# Patient Record
Sex: MALE | URBAN - METROPOLITAN AREA
[De-identification: names, ages, dates, MRNs, and addresses within clinical notes are randomized per-mention and may not be internally consistent; named-entity substitution may affect disease eponyms.]

---

## 2023-09-26 ENCOUNTER — HOSPITAL ENCOUNTER (OUTPATIENT)
Dept: GENERAL RADIOLOGY | Age: 53
Discharge: HOME | End: 2023-09-29

## 2023-09-26 DIAGNOSIS — T14.90XA INJURY: ICD-10-CM

## 2023-09-26 PROCEDURE — 73140 X-RAY EXAM OF FINGER(S): CPT

## 2023-10-11 ENCOUNTER — OFFICE VISIT (OUTPATIENT)
Dept: ORTHOPEDIC SURGERY | Age: 53
End: 2023-10-11
Payer: COMMERCIAL

## 2023-10-11 ENCOUNTER — EVALUATION (OUTPATIENT)
Age: 53
End: 2023-10-11

## 2023-10-11 DIAGNOSIS — M20.012 MALLET DEFORMITY OF LEFT RING FINGER: ICD-10-CM

## 2023-10-11 DIAGNOSIS — M20.012 MALLET DEFORMITY OF LEFT RING FINGER: Primary | ICD-10-CM

## 2023-10-11 DIAGNOSIS — M79.645 FINGER PAIN, LEFT: ICD-10-CM

## 2023-10-11 DIAGNOSIS — M25.642 STIFFNESS OF FINGER JOINT OF LEFT HAND: ICD-10-CM

## 2023-10-11 DIAGNOSIS — M79.645 FINGER PAIN, LEFT: Primary | ICD-10-CM

## 2023-10-11 PROCEDURE — 99204 OFFICE O/P NEW MOD 45 MIN: CPT | Performed by: ORTHOPAEDIC SURGERY

## 2023-10-11 PROCEDURE — L3933 FO W/O JOINTS CF: HCPCS | Performed by: OCCUPATIONAL THERAPIST

## 2023-10-11 NOTE — PROGRESS NOTES
hyperextend the DIP joint, hopefully the custom made splint will help hyperextend the DIP joint more and correct the deformity, I will reassess in 4 weeks. Patient voiced accordance and understanding of the information provided and the formulated plan. All questions were answered to the patient's satisfaction during the encounter.     Navya Maradiaga MD  Orthopaedic Surgery  10/11/23  12:24 PM

## 2023-11-01 ENCOUNTER — TELEPHONE (OUTPATIENT)
Age: 53
End: 2023-11-01

## 2023-11-01 NOTE — TELEPHONE ENCOUNTER
Finally rec'ed approval for 8 OT visits from W/C called pt to get scheduled with [facility]. Lvm to call therapy dept back

## 2023-11-03 NOTE — TELEPHONE ENCOUNTER
Pt called and lvm at 4:30pm 11/2/23---I attempted to call back this morning---Lvm to call therapy dept back

## 2023-11-15 ENCOUNTER — OFFICE VISIT (OUTPATIENT)
Dept: ORTHOPEDIC SURGERY | Age: 53
End: 2023-11-15

## 2023-11-15 ENCOUNTER — TREATMENT (OUTPATIENT)
Age: 53
End: 2023-11-15

## 2023-11-15 DIAGNOSIS — M25.642 STIFFNESS OF FINGER JOINT OF LEFT HAND: Primary | ICD-10-CM

## 2023-11-15 DIAGNOSIS — M79.645 FINGER PAIN, LEFT: ICD-10-CM

## 2023-11-15 DIAGNOSIS — M20.012 MALLET DEFORMITY OF LEFT RING FINGER: Primary | ICD-10-CM

## 2023-11-15 PROCEDURE — 97760 ORTHOTIC MGMT&TRAING 1ST ENC: CPT | Performed by: OCCUPATIONAL THERAPIST

## 2023-11-15 NOTE — PROGRESS NOTES
[facility] Fin[facility]  [address] [place] [id number]  Dept: [phone]   Occupational Therapy Orthotic Recheck/ Orthotic Initial Management Note    Referring MD:   Dr Carisa Mack  Date: 11/15/2023  Diagnosis:    Diagnosis Orders   1. Stiffness of finger joint of left hand        2.  Finger pain, left [M79.645]             Therapy Precautions: Tendon precautions    Total Timed Codes:   15 min, Total Treatment Time: 15 min  Modifier needed: No  Episode visit count:  2     PMH:   Affected Extremity: left    Date of Injury:   9/26/23    Date of Surgery: NA    Mechanism of Injury: football    Wound/Pin/Incision: NA    ORTHOSIS Fabricated:     Fabricated new mallet splint due to decreasing edema to ensure proper fit and to increase DIP within orthosis per Dr Alison Bowman , (charging Orthotic Initial Management)  per Dr Alison Bowman, may wean from orthosis for AROM, advised pt to perform AROM 3 to 4 x daily and monitor for any DIP ext loss      TREATMENT PROVIDED:   Patient instructed in purpose, care, and precaution of orthosis wearing and Patient instructed in wearing schedule    WEAR SCHEDULE:   Remove for AROM, continue in between ex and at night x 1 wk,  monitor for DIP ext loss,     CARE OF ORTHOSIS AND PRECAUTIONS REVIEWED:   The orthosis can be cleaned with soap and water, rubbing alcohol, or a mild cleanser  Keep away from any direct source of heat, it will melt and/or lose it's shape  Keep away from dogs and/or pets that will chew the orthosis  Should the orthosis result in increased pain, numbness/tingling, increased swelling, or overall worsening of condition, patient is to contact the office immediately during business hours     TREATMENT GOALS:   Pt to verbalize understanding of wean from mallet orthosis over 2 to 3 wks sequentially while monitoring for DIP Ext loss, may call OT for questions/apptment PRN    ALL TREATMENT GOALS    Yes    PLAN:   Follow up on a PRN basis

## 2023-11-15 NOTE — PROGRESS NOTES
Orthopaedic Hand Clinic Note    Name: Donold Lombard  Age: 48 y.o. YOB: 1970  Gender: male  MRN: [id number]      Follow up visit:   1. Mallet deformity of left ring finger        HPI: Donold Lombard is a 48 y.o. male who is following up for left ring mallet fracture, patient has been wearing the splint for the past 4 weeks, he would prefer to wean out of it and resume work. ROS/Meds/PSH/PMH/FH/SH: I personally reviewed the patients standard intake form. Pertinents are discussed in the HPI    Physical Examination:  General: Awake and alert. HEENT: Normocephalic, atraumatic  CV/Pulm: Breathing even and unlabored  Skin: No obvious rashes noted. Lymphatic: No obvious evidence of lymphedema or lymphadenopathy    Musculoskeletal Examination:  Examination on the left upper extremity demonstrates cap refill < 5 seconds in all fingers, 5 degree extension lag at the DIP joint, good finger flexion. Imaging / Electrodiagnostic Tests:     left ring finger XR: AP, Lateral, Oblique views     Clinical Indication  1. Mallet deformity of left ring finger           Report: AP, Lateral, Oblique XR demonstrates 5 degree extension lag of the DIP joint, fracture fragment on the dorsal aspect and unchanged alignment    Impression: as above     Odell Vyas MD         Assessment:   1. Mallet deformity of left ring finger        Plan:   We discussed the diagnosis and different treatment options. We discussed observation, therapy, antiinflammatory medications and other pertinent treatment modalities. After discussing in detail the patient elects to proceed with revision splint to gain more extension of the DIP joint, he will wear this at nighttime for the next 4 weeks, he may resume work without restrictions, I will reassess the patient in 4 weeks without x-rays and perform rate and release of applicable. I personally discussed the case with a hand therapist to guide his treatment.      Patient voiced

## 2023-12-13 ENCOUNTER — OFFICE VISIT (OUTPATIENT)
Dept: ORTHOPEDIC SURGERY | Age: 53
End: 2023-12-13
Payer: COMMERCIAL

## 2023-12-13 DIAGNOSIS — M20.012 MALLET DEFORMITY OF LEFT RING FINGER: Primary | ICD-10-CM

## 2023-12-13 PROCEDURE — 99213 OFFICE O/P EST LOW 20 MIN: CPT | Performed by: ORTHOPAEDIC SURGERY

## 2023-12-13 NOTE — PROGRESS NOTES
Orthopaedic Hand Clinic Note    Name: Sandrine Dorantes  Age: 48 y.o. YOB: 1970  Gender: male  MRN: [id number]      Follow up visit:   1. Mallet deformity of left ring finger        HPI: Sandrine Dorantes is a 48 y.o. male who is following up for left ring mallet fracture, the patient had 4 weeks of 24-hour splinting and 4 weeks of night splinting, he still has some extension lag but he has no pain. ROS/Meds/PSH/PMH/FH/SH: I personally reviewed the patients standard intake form. Pertinents are discussed in the HPI    Physical Examination:  General: Awake and alert. HEENT: Normocephalic, atraumatic  CV/Pulm: Breathing even and unlabored  Skin: No obvious rashes noted. Lymphatic: No obvious evidence of lymphedema or lymphadenopathy    Musculoskeletal Examination:  Examination on the left upper extremity demonstrates cap refill < 5 seconds in all fingers, mild deformity of the left ring finger with 10 to 15 degrees of extension lag, this is passively correctable, he has very mild hyperextension of the PIP joint, he can make a full fist, there is no tenderness to palpation. Imaging / Electrodiagnostic Tests:     none    Assessment:   1. Mallet deformity of left ring finger        Plan:   We discussed the diagnosis and different treatment options. We discussed observation, therapy, antiinflammatory medications and other pertinent treatment modalities. After discussing in detail the patient elects to proceed with activity as tolerated, resume work without restrictions, he will follow-up on an as-needed basis, we discussed that he still has some extension lag and this may still progress into a swan-neck deformity over time, if this causes any chronic issues in the future then I am more than happy to reassess and any pathology related to swan-neck deformity in the ring finger can be traced back to this work injury.   According to the [facility] guides to the evaluation from impairment sixth edition the